# Patient Record
Sex: MALE | Race: WHITE
[De-identification: names, ages, dates, MRNs, and addresses within clinical notes are randomized per-mention and may not be internally consistent; named-entity substitution may affect disease eponyms.]

---

## 2019-01-01 ENCOUNTER — HOSPITAL ENCOUNTER (EMERGENCY)
Dept: HOSPITAL 25 - UCCORT | Age: 39
Discharge: HOME | End: 2019-01-01
Payer: COMMERCIAL

## 2019-01-01 VITALS — SYSTOLIC BLOOD PRESSURE: 119 MMHG | DIASTOLIC BLOOD PRESSURE: 82 MMHG

## 2019-01-01 DIAGNOSIS — J06.9: Primary | ICD-10-CM

## 2019-01-01 PROCEDURE — 99201: CPT

## 2019-01-01 PROCEDURE — G0463 HOSPITAL OUTPT CLINIC VISIT: HCPCS

## 2019-01-01 NOTE — UC
Throat Pain/Nasal Gil HPI





- HPI Summary


HPI Summary: 





nasal congestion x 4 days


pnd , sinus pain and pressure, cough 


no fever, no chills








- History of Current Complaint


Chief Complaint: UCGeneralIllness


Stated Complaint: EARS,SINUSES,HEADACHE


Time Seen by Provider: 01/01/19 09:44


Hx Obtained From: Patient


Onset/Duration: Gradual Onset, Lasting Days - 4, Still Present


Severity: Moderate


Pain Intensity: 5


Cough: Nonproductive


Associated Signs & Symptoms: Positive: Sinus Discomfort, Nasal Discharge.  

Negative: Wheezing, Hoarseness, Fever, Vomiting, Rash





- Allergies/Home Medications


Allergies/Adverse Reactions: 


 Allergies











Allergy/AdvReac Type Severity Reaction Status Date / Time


 


No Known Allergies Allergy   Verified 01/01/19 09:28











Home Medications: 


 Home Medications





Ibuprofen [Motrin Ib] 400 mg PO Q6H PRN 01/01/19 [History Confirmed 01/01/19]











PMH/Surg Hx/FS Hx/Imm Hx


Previously Healthy: Yes





- Surgical History


Surgical History: Yes


Surgery Procedure, Year, and Place: appy





- Family History


Known Family History: 


   Negative: Diabetes





- Social History


Alcohol Use: Weekly


Substance Use Type: None


Smoking Status (MU): Never Smoked Tobacco





Review of Systems


All Other Systems Reviewed And Are Negative: Yes


Constitutional: Positive: Negative


Skin: Positive: Negative


Eyes: Positive: Negative


ENT: Positive: Sore Throat, Nasal Discharge, Sinus Congestion, Sinus Pain/

Tenderness


Respiratory: Positive: Cough


Cardiovascular: Positive: Negative


Is Patient Immunocompromised?: No





Physical Exam


Triage Information Reviewed: Yes


Appearance: Well-Appearing, No Pain Distress, Well-Nourished


Vital Signs: 


 Initial Vital Signs











Temp  97.6 F   01/01/19 09:30


 


Pulse  102   01/01/19 09:30


 


Resp  20   01/01/19 09:30


 


BP  119/82   01/01/19 09:30


 


Pulse Ox  96   01/01/19 09:30











Vital Signs Reviewed: Yes


Eye Exam: Normal


Eyes: Positive: Conjunctiva Clear


ENT: Positive: Normal ENT inspection, Hearing grossly normal, Pharyngeal 

erythema, Nasal congestion, Nasal drainage, TMs normal.  Negative: TM bulging, 

TM dull, TM red, Tonsillar swelling, Tonsillar exudate, Sinus tenderness


Neck: Positive: Supple, Nontender, No Lymphadenopathy


Respiratory: Positive: Chest non-tender, Lungs clear, Normal breath sounds


Cardiovascular: Positive: RRR, No Murmur, Pulses Normal


Abdominal Exam: Normal





Throat Pain/Nasal Course/Dx





- Differential Dx/Diagnosis


Provider Diagnosis: 


 URI (upper respiratory infection)








Discharge





- Sign-Out/Discharge


Documenting (check all that apply): Patient Departure


All imaging exams completed and their final reports reviewed: No Studies





- Discharge Plan


Condition: Stable


Disposition: HOME


Patient Education Materials:  Upper Respiratory Infection (DC)


Referrals: 


No Primary Care Phys,NOPCP [Primary Care Provider] - If Needed


Additional Instructions: 


viral illness


no need for antibiotics


increase fluid, 


try Flonase nasal spray daily 





- Billing Disposition and Condition


Condition: STABLE


Disposition: Home